# Patient Record
Sex: MALE | Race: WHITE | NOT HISPANIC OR LATINO | Employment: FULL TIME | URBAN - METROPOLITAN AREA
[De-identification: names, ages, dates, MRNs, and addresses within clinical notes are randomized per-mention and may not be internally consistent; named-entity substitution may affect disease eponyms.]

---

## 2024-11-03 ENCOUNTER — APPOINTMENT (OUTPATIENT)
Dept: RADIOLOGY | Facility: HOSPITAL | Age: 29
End: 2024-11-03

## 2024-11-03 ENCOUNTER — HOSPITAL ENCOUNTER (OUTPATIENT)
Facility: HOSPITAL | Age: 29
Setting detail: OBSERVATION
Discharge: HOME | End: 2024-11-04
Attending: EMERGENCY MEDICINE | Admitting: INTERNAL MEDICINE

## 2024-11-03 ENCOUNTER — APPOINTMENT (OUTPATIENT)
Dept: CARDIOLOGY | Facility: HOSPITAL | Age: 29
End: 2024-11-03

## 2024-11-03 DIAGNOSIS — R00.1 SEVERE SINUS BRADYCARDIA: ICD-10-CM

## 2024-11-03 DIAGNOSIS — R55 SYNCOPE AND COLLAPSE: Primary | ICD-10-CM

## 2024-11-03 LAB
ANION GAP SERPL CALC-SCNC: 10 MMOL/L (ref 10–20)
BASOPHILS # BLD AUTO: 0.04 X10*3/UL (ref 0–0.1)
BASOPHILS NFR BLD AUTO: 0.4 %
BUN SERPL-MCNC: 11 MG/DL (ref 6–23)
CALCIUM SERPL-MCNC: 8.8 MG/DL (ref 8.6–10.3)
CHLORIDE SERPL-SCNC: 105 MMOL/L (ref 98–107)
CO2 SERPL-SCNC: 24 MMOL/L (ref 21–32)
CREAT SERPL-MCNC: 1.2 MG/DL (ref 0.5–1.3)
EGFRCR SERPLBLD CKD-EPI 2021: 84 ML/MIN/1.73M*2
EOSINOPHIL # BLD AUTO: 0.07 X10*3/UL (ref 0–0.7)
EOSINOPHIL NFR BLD AUTO: 0.6 %
ERYTHROCYTE [DISTWIDTH] IN BLOOD BY AUTOMATED COUNT: 12 % (ref 11.5–14.5)
ETHANOL SERPL-MCNC: <10 MG/DL
GLUCOSE SERPL-MCNC: 115 MG/DL (ref 74–99)
HCT VFR BLD AUTO: 41.9 % (ref 41–52)
HGB BLD-MCNC: 14.5 G/DL (ref 13.5–17.5)
IMM GRANULOCYTES # BLD AUTO: 0.06 X10*3/UL (ref 0–0.7)
IMM GRANULOCYTES NFR BLD AUTO: 0.6 % (ref 0–0.9)
LACTATE SERPL-SCNC: 1 MMOL/L (ref 0.4–2)
LYMPHOCYTES # BLD AUTO: 4.52 X10*3/UL (ref 1.2–4.8)
LYMPHOCYTES NFR BLD AUTO: 41.6 %
MAGNESIUM SERPL-MCNC: 2.39 MG/DL (ref 1.6–2.4)
MCH RBC QN AUTO: 31.1 PG (ref 26–34)
MCHC RBC AUTO-ENTMCNC: 34.6 G/DL (ref 32–36)
MCV RBC AUTO: 90 FL (ref 80–100)
MONOCYTES # BLD AUTO: 0.94 X10*3/UL (ref 0.1–1)
MONOCYTES NFR BLD AUTO: 8.7 %
NEUTROPHILS # BLD AUTO: 5.23 X10*3/UL (ref 1.2–7.7)
NEUTROPHILS NFR BLD AUTO: 48.1 %
NRBC BLD-RTO: 0 /100 WBCS (ref 0–0)
PHOSPHATE SERPL-MCNC: 4.4 MG/DL (ref 2.5–4.9)
PLATELET # BLD AUTO: 236 X10*3/UL (ref 150–450)
POTASSIUM SERPL-SCNC: 3.4 MMOL/L (ref 3.5–5.3)
RBC # BLD AUTO: 4.66 X10*6/UL (ref 4.5–5.9)
SODIUM SERPL-SCNC: 136 MMOL/L (ref 136–145)
WBC # BLD AUTO: 10.9 X10*3/UL (ref 4.4–11.3)

## 2024-11-03 PROCEDURE — 82077 ASSAY SPEC XCP UR&BREATH IA: CPT | Performed by: EMERGENCY MEDICINE

## 2024-11-03 PROCEDURE — 80048 BASIC METABOLIC PNL TOTAL CA: CPT | Performed by: EMERGENCY MEDICINE

## 2024-11-03 PROCEDURE — 72125 CT NECK SPINE W/O DYE: CPT | Performed by: RADIOLOGY

## 2024-11-03 PROCEDURE — 36415 COLL VENOUS BLD VENIPUNCTURE: CPT | Performed by: EMERGENCY MEDICINE

## 2024-11-03 PROCEDURE — 70450 CT HEAD/BRAIN W/O DYE: CPT

## 2024-11-03 PROCEDURE — 71045 X-RAY EXAM CHEST 1 VIEW: CPT

## 2024-11-03 PROCEDURE — 72125 CT NECK SPINE W/O DYE: CPT

## 2024-11-03 PROCEDURE — 83735 ASSAY OF MAGNESIUM: CPT | Performed by: EMERGENCY MEDICINE

## 2024-11-03 PROCEDURE — 99223 1ST HOSP IP/OBS HIGH 75: CPT | Performed by: INTERNAL MEDICINE

## 2024-11-03 PROCEDURE — 83605 ASSAY OF LACTIC ACID: CPT | Performed by: EMERGENCY MEDICINE

## 2024-11-03 PROCEDURE — 70450 CT HEAD/BRAIN W/O DYE: CPT | Performed by: RADIOLOGY

## 2024-11-03 PROCEDURE — 93005 ELECTROCARDIOGRAM TRACING: CPT

## 2024-11-03 PROCEDURE — 84100 ASSAY OF PHOSPHORUS: CPT | Performed by: EMERGENCY MEDICINE

## 2024-11-03 PROCEDURE — 85025 COMPLETE CBC W/AUTO DIFF WBC: CPT | Performed by: EMERGENCY MEDICINE

## 2024-11-03 PROCEDURE — 99285 EMERGENCY DEPT VISIT HI MDM: CPT

## 2024-11-03 RX ORDER — POLYETHYLENE GLYCOL 3350 17 G/17G
17 POWDER, FOR SOLUTION ORAL DAILY PRN
Status: DISCONTINUED | OUTPATIENT
Start: 2024-11-03 | End: 2024-11-04 | Stop reason: HOSPADM

## 2024-11-03 RX ORDER — ONDANSETRON 4 MG/1
4 TABLET, FILM COATED ORAL EVERY 8 HOURS PRN
Status: DISCONTINUED | OUTPATIENT
Start: 2024-11-03 | End: 2024-11-04 | Stop reason: HOSPADM

## 2024-11-03 RX ORDER — TALC
3 POWDER (GRAM) TOPICAL NIGHTLY PRN
Status: DISCONTINUED | OUTPATIENT
Start: 2024-11-03 | End: 2024-11-04 | Stop reason: HOSPADM

## 2024-11-03 RX ORDER — ONDANSETRON HYDROCHLORIDE 2 MG/ML
4 INJECTION, SOLUTION INTRAVENOUS EVERY 8 HOURS PRN
Status: DISCONTINUED | OUTPATIENT
Start: 2024-11-03 | End: 2024-11-04 | Stop reason: HOSPADM

## 2024-11-03 RX ORDER — ACETAMINOPHEN 160 MG/5ML
650 SOLUTION ORAL EVERY 4 HOURS PRN
Status: DISCONTINUED | OUTPATIENT
Start: 2024-11-03 | End: 2024-11-04 | Stop reason: HOSPADM

## 2024-11-03 RX ORDER — ACETAMINOPHEN 325 MG/1
650 TABLET ORAL EVERY 4 HOURS PRN
Status: DISCONTINUED | OUTPATIENT
Start: 2024-11-03 | End: 2024-11-04 | Stop reason: HOSPADM

## 2024-11-03 RX ORDER — ACETAMINOPHEN 650 MG/1
650 SUPPOSITORY RECTAL EVERY 4 HOURS PRN
Status: DISCONTINUED | OUTPATIENT
Start: 2024-11-03 | End: 2024-11-04 | Stop reason: HOSPADM

## 2024-11-03 ASSESSMENT — PAIN SCALES - GENERAL: PAINLEVEL_OUTOF10: 5 - MODERATE PAIN

## 2024-11-03 ASSESSMENT — PAIN DESCRIPTION - LOCATION: LOCATION: MOUTH

## 2024-11-03 ASSESSMENT — COLUMBIA-SUICIDE SEVERITY RATING SCALE - C-SSRS
1. IN THE PAST MONTH, HAVE YOU WISHED YOU WERE DEAD OR WISHED YOU COULD GO TO SLEEP AND NOT WAKE UP?: NO
6. HAVE YOU EVER DONE ANYTHING, STARTED TO DO ANYTHING, OR PREPARED TO DO ANYTHING TO END YOUR LIFE?: NO
2. HAVE YOU ACTUALLY HAD ANY THOUGHTS OF KILLING YOURSELF?: NO

## 2024-11-03 ASSESSMENT — PAIN - FUNCTIONAL ASSESSMENT: PAIN_FUNCTIONAL_ASSESSMENT: 0-10

## 2024-11-03 ASSESSMENT — LIFESTYLE VARIABLES
EVER FELT BAD OR GUILTY ABOUT YOUR DRINKING: NO
HAVE PEOPLE ANNOYED YOU BY CRITICIZING YOUR DRINKING: NO
TOTAL SCORE: 0
HAVE YOU EVER FELT YOU SHOULD CUT DOWN ON YOUR DRINKING: NO
EVER HAD A DRINK FIRST THING IN THE MORNING TO STEADY YOUR NERVES TO GET RID OF A HANGOVER: NO

## 2024-11-04 ENCOUNTER — APPOINTMENT (OUTPATIENT)
Dept: CARDIOLOGY | Facility: HOSPITAL | Age: 29
End: 2024-11-04

## 2024-11-04 VITALS
WEIGHT: 215 LBS | RESPIRATION RATE: 16 BRPM | OXYGEN SATURATION: 98 % | HEIGHT: 69 IN | TEMPERATURE: 97.5 F | HEART RATE: 51 BPM | DIASTOLIC BLOOD PRESSURE: 68 MMHG | BODY MASS INDEX: 31.84 KG/M2 | SYSTOLIC BLOOD PRESSURE: 133 MMHG

## 2024-11-04 PROBLEM — F12.90 MARIJUANA USE: Status: ACTIVE | Noted: 2024-11-04

## 2024-11-04 PROBLEM — E87.6 HYPOKALEMIA: Status: ACTIVE | Noted: 2024-11-04

## 2024-11-04 PROBLEM — R00.1 BRADYCARDIA: Status: ACTIVE | Noted: 2024-11-04

## 2024-11-04 LAB
AMPHETAMINES UR QL SCN: ABNORMAL
ANION GAP SERPL CALC-SCNC: 9 MMOL/L (ref 10–20)
AORTIC VALVE MEAN GRADIENT: 3 MMHG
AORTIC VALVE PEAK VELOCITY: 1.18 M/S
ATRIAL RATE: 60 BPM
AV PEAK GRADIENT: 6 MMHG
AVA (PEAK VEL): 3.43 CM2
AVA (VTI): 3.37 CM2
BARBITURATES UR QL SCN: ABNORMAL
BENZODIAZ UR QL SCN: ABNORMAL
BUN SERPL-MCNC: 11 MG/DL (ref 6–23)
BZE UR QL SCN: ABNORMAL
CALCIUM SERPL-MCNC: 8.6 MG/DL (ref 8.6–10.3)
CANNABINOIDS UR QL SCN: ABNORMAL
CARDIAC TROPONIN I PNL SERPL HS: 3 NG/L (ref 0–20)
CHLORIDE SERPL-SCNC: 106 MMOL/L (ref 98–107)
CO2 SERPL-SCNC: 24 MMOL/L (ref 21–32)
CREAT SERPL-MCNC: 1.03 MG/DL (ref 0.5–1.3)
EGFRCR SERPLBLD CKD-EPI 2021: >90 ML/MIN/1.73M*2
EJECTION FRACTION APICAL 4 CHAMBER: 60.5
EJECTION FRACTION: 60 %
ERYTHROCYTE [DISTWIDTH] IN BLOOD BY AUTOMATED COUNT: 12.2 % (ref 11.5–14.5)
FENTANYL+NORFENTANYL UR QL SCN: ABNORMAL
GLUCOSE SERPL-MCNC: 116 MG/DL (ref 74–99)
HCT VFR BLD AUTO: 42.9 % (ref 41–52)
HGB BLD-MCNC: 15 G/DL (ref 13.5–17.5)
HOLD SPECIMEN: NORMAL
LEFT ATRIUM VOLUME AREA LENGTH INDEX BSA: 26.2 ML/M2
LEFT VENTRICLE INTERNAL DIMENSION DIASTOLE: 4.8 CM (ref 3.5–6)
LEFT VENTRICULAR OUTFLOW TRACT DIAMETER: 2.1 CM
LV EJECTION FRACTION BIPLANE: 66 %
MCH RBC QN AUTO: 31.9 PG (ref 26–34)
MCHC RBC AUTO-ENTMCNC: 35 G/DL (ref 32–36)
MCV RBC AUTO: 91 FL (ref 80–100)
METHADONE UR QL SCN: ABNORMAL
MITRAL VALVE E/A RATIO: 2.34
NRBC BLD-RTO: 0 /100 WBCS (ref 0–0)
OPIATES UR QL SCN: ABNORMAL
OXYCODONE+OXYMORPHONE UR QL SCN: ABNORMAL
P AXIS: 57 DEGREES
P OFFSET: 195 MS
P ONSET: 138 MS
PCP UR QL SCN: ABNORMAL
PLATELET # BLD AUTO: 261 X10*3/UL (ref 150–450)
POTASSIUM SERPL-SCNC: 3.9 MMOL/L (ref 3.5–5.3)
PR INTERVAL: 164 MS
Q ONSET: 220 MS
QRS COUNT: 10 BEATS
QRS DURATION: 94 MS
QT INTERVAL: 392 MS
QTC CALCULATION(BAZETT): 392 MS
QTC FREDERICIA: 392 MS
R AXIS: 66 DEGREES
RBC # BLD AUTO: 4.7 X10*6/UL (ref 4.5–5.9)
RIGHT VENTRICLE FREE WALL PEAK S': 16.1 CM/S
RIGHT VENTRICLE PEAK SYSTOLIC PRESSURE: 20.3 MMHG
SODIUM SERPL-SCNC: 135 MMOL/L (ref 136–145)
T AXIS: 69 DEGREES
T OFFSET: 416 MS
TRICUSPID ANNULAR PLANE SYSTOLIC EXCURSION: 2.5 CM
VENTRICULAR RATE: 60 BPM
WBC # BLD AUTO: 11.2 X10*3/UL (ref 4.4–11.3)

## 2024-11-04 PROCEDURE — 93306 TTE W/DOPPLER COMPLETE: CPT

## 2024-11-04 PROCEDURE — 2500000004 HC RX 250 GENERAL PHARMACY W/ HCPCS (ALT 636 FOR OP/ED): Performed by: INTERNAL MEDICINE

## 2024-11-04 PROCEDURE — 99239 HOSP IP/OBS DSCHRG MGMT >30: CPT

## 2024-11-04 PROCEDURE — 93306 TTE W/DOPPLER COMPLETE: CPT | Performed by: INTERNAL MEDICINE

## 2024-11-04 PROCEDURE — 2500000001 HC RX 250 WO HCPCS SELF ADMINISTERED DRUGS (ALT 637 FOR MEDICARE OP): Performed by: INTERNAL MEDICINE

## 2024-11-04 PROCEDURE — 93005 ELECTROCARDIOGRAM TRACING: CPT

## 2024-11-04 PROCEDURE — G0378 HOSPITAL OBSERVATION PER HR: HCPCS

## 2024-11-04 PROCEDURE — 36415 COLL VENOUS BLD VENIPUNCTURE: CPT | Performed by: INTERNAL MEDICINE

## 2024-11-04 PROCEDURE — 84484 ASSAY OF TROPONIN QUANT: CPT | Performed by: NURSE PRACTITIONER

## 2024-11-04 PROCEDURE — 99223 1ST HOSP IP/OBS HIGH 75: CPT | Performed by: INTERNAL MEDICINE

## 2024-11-04 PROCEDURE — 85027 COMPLETE CBC AUTOMATED: CPT | Performed by: INTERNAL MEDICINE

## 2024-11-04 PROCEDURE — 80307 DRUG TEST PRSMV CHEM ANLYZR: CPT | Performed by: EMERGENCY MEDICINE

## 2024-11-04 PROCEDURE — 80048 BASIC METABOLIC PNL TOTAL CA: CPT | Performed by: INTERNAL MEDICINE

## 2024-11-04 RX ORDER — POTASSIUM CHLORIDE 1.5 G/1.58G
40 POWDER, FOR SOLUTION ORAL ONCE
Status: COMPLETED | OUTPATIENT
Start: 2024-11-04 | End: 2024-11-04

## 2024-11-04 SDOH — SOCIAL STABILITY: SOCIAL INSECURITY
WITHIN THE LAST YEAR, HAVE YOU BEEN KICKED, HIT, SLAPPED, OR OTHERWISE PHYSICALLY HURT BY YOUR PARTNER OR EX-PARTNER?: NO

## 2024-11-04 SDOH — ECONOMIC STABILITY: HOUSING INSECURITY: IN THE PAST 12 MONTHS, HOW MANY TIMES HAVE YOU MOVED WHERE YOU WERE LIVING?: 0

## 2024-11-04 SDOH — ECONOMIC STABILITY: FOOD INSECURITY
WITHIN THE PAST 12 MONTHS, YOU WORRIED THAT YOUR FOOD WOULD RUN OUT BEFORE YOU GOT THE MONEY TO BUY MORE.: PATIENT DECLINED

## 2024-11-04 SDOH — SOCIAL STABILITY: SOCIAL INSECURITY: WITHIN THE LAST YEAR, HAVE YOU BEEN AFRAID OF YOUR PARTNER OR EX-PARTNER?: NO

## 2024-11-04 SDOH — HEALTH STABILITY: PHYSICAL HEALTH: ON AVERAGE, HOW MANY MINUTES DO YOU ENGAGE IN EXERCISE AT THIS LEVEL?: 0 MIN

## 2024-11-04 SDOH — ECONOMIC STABILITY: INCOME INSECURITY
IN THE PAST 12 MONTHS HAS THE ELECTRIC, GAS, OIL, OR WATER COMPANY THREATENED TO SHUT OFF SERVICES IN YOUR HOME?: PATIENT DECLINED

## 2024-11-04 SDOH — SOCIAL STABILITY: SOCIAL INSECURITY: DOES ANYONE TRY TO KEEP YOU FROM HAVING/CONTACTING OTHER FRIENDS OR DOING THINGS OUTSIDE YOUR HOME?: NO

## 2024-11-04 SDOH — ECONOMIC STABILITY: HOUSING INSECURITY: AT ANY TIME IN THE PAST 12 MONTHS, WERE YOU HOMELESS OR LIVING IN A SHELTER (INCLUDING NOW)?: PATIENT DECLINED

## 2024-11-04 SDOH — ECONOMIC STABILITY: FOOD INSECURITY: WITHIN THE PAST 12 MONTHS, THE FOOD YOU BOUGHT JUST DIDN'T LAST AND YOU DIDN'T HAVE MONEY TO GET MORE.: PATIENT DECLINED

## 2024-11-04 SDOH — SOCIAL STABILITY: SOCIAL INSECURITY: HAVE YOU HAD THOUGHTS OF HARMING ANYONE ELSE?: NO

## 2024-11-04 SDOH — SOCIAL STABILITY: SOCIAL INSECURITY: DO YOU FEEL UNSAFE GOING BACK TO THE PLACE WHERE YOU ARE LIVING?: NO

## 2024-11-04 SDOH — ECONOMIC STABILITY: FOOD INSECURITY: HOW HARD IS IT FOR YOU TO PAY FOR THE VERY BASICS LIKE FOOD, HOUSING, MEDICAL CARE, AND HEATING?: PATIENT DECLINED

## 2024-11-04 SDOH — HEALTH STABILITY: PHYSICAL HEALTH: ON AVERAGE, HOW MANY DAYS PER WEEK DO YOU ENGAGE IN MODERATE TO STRENUOUS EXERCISE (LIKE A BRISK WALK)?: 0 DAYS

## 2024-11-04 SDOH — SOCIAL STABILITY: SOCIAL INSECURITY: ARE THERE ANY APPARENT SIGNS OF INJURIES/BEHAVIORS THAT COULD BE RELATED TO ABUSE/NEGLECT?: NO

## 2024-11-04 SDOH — SOCIAL STABILITY: SOCIAL INSECURITY: ARE YOU OR HAVE YOU BEEN THREATENED OR ABUSED PHYSICALLY, EMOTIONALLY, OR SEXUALLY BY ANYONE?: NO

## 2024-11-04 SDOH — SOCIAL STABILITY: SOCIAL INSECURITY: ABUSE: ADULT

## 2024-11-04 SDOH — SOCIAL STABILITY: SOCIAL INSECURITY: HAS ANYONE EVER THREATENED TO HURT YOUR FAMILY OR YOUR PETS?: NO

## 2024-11-04 SDOH — SOCIAL STABILITY: SOCIAL INSECURITY: DO YOU FEEL ANYONE HAS EXPLOITED OR TAKEN ADVANTAGE OF YOU FINANCIALLY OR OF YOUR PERSONAL PROPERTY?: NO

## 2024-11-04 SDOH — SOCIAL STABILITY: SOCIAL INSECURITY
WITHIN THE LAST YEAR, HAVE YOU BEEN RAPED OR FORCED TO HAVE ANY KIND OF SEXUAL ACTIVITY BY YOUR PARTNER OR EX-PARTNER?: NO

## 2024-11-04 SDOH — ECONOMIC STABILITY: TRANSPORTATION INSECURITY
IN THE PAST 12 MONTHS, HAS LACK OF TRANSPORTATION KEPT YOU FROM MEDICAL APPOINTMENTS OR FROM GETTING MEDICATIONS?: PATIENT DECLINED

## 2024-11-04 SDOH — SOCIAL STABILITY: SOCIAL INSECURITY: HAVE YOU HAD ANY THOUGHTS OF HARMING ANYONE ELSE?: NO

## 2024-11-04 SDOH — SOCIAL STABILITY: SOCIAL INSECURITY: WERE YOU ABLE TO COMPLETE ALL THE BEHAVIORAL HEALTH SCREENINGS?: YES

## 2024-11-04 SDOH — ECONOMIC STABILITY: HOUSING INSECURITY: IN THE LAST 12 MONTHS, WAS THERE A TIME WHEN YOU WERE NOT ABLE TO PAY THE MORTGAGE OR RENT ON TIME?: PATIENT DECLINED

## 2024-11-04 SDOH — SOCIAL STABILITY: SOCIAL INSECURITY: WITHIN THE LAST YEAR, HAVE YOU BEEN HUMILIATED OR EMOTIONALLY ABUSED IN OTHER WAYS BY YOUR PARTNER OR EX-PARTNER?: NO

## 2024-11-04 ASSESSMENT — ACTIVITIES OF DAILY LIVING (ADL)
ADEQUATE_TO_COMPLETE_ADL: YES
BATHING: INDEPENDENT
TOILETING: INDEPENDENT
LACK_OF_TRANSPORTATION: PATIENT DECLINED
HEARING - RIGHT EAR: FUNCTIONAL
PATIENT'S MEMORY ADEQUATE TO SAFELY COMPLETE DAILY ACTIVITIES?: YES
JUDGMENT_ADEQUATE_SAFELY_COMPLETE_DAILY_ACTIVITIES: YES
HEARING - LEFT EAR: FUNCTIONAL
GROOMING: INDEPENDENT
WALKS IN HOME: INDEPENDENT
LACK_OF_TRANSPORTATION: PATIENT DECLINED
FEEDING YOURSELF: INDEPENDENT
DRESSING YOURSELF: INDEPENDENT

## 2024-11-04 ASSESSMENT — COGNITIVE AND FUNCTIONAL STATUS - GENERAL
MOBILITY SCORE: 24
DAILY ACTIVITIY SCORE: 24
MOBILITY SCORE: 24
DAILY ACTIVITIY SCORE: 24
PATIENT BASELINE BEDBOUND: NO

## 2024-11-04 ASSESSMENT — LIFESTYLE VARIABLES
HOW MANY STANDARD DRINKS CONTAINING ALCOHOL DO YOU HAVE ON A TYPICAL DAY: 3 OR 4
AUDIT-C TOTAL SCORE: -1
HOW OFTEN DO YOU HAVE 6 OR MORE DRINKS ON ONE OCCASION: PATIENT DECLINED
AUDIT-C TOTAL SCORE: -1
HOW OFTEN DO YOU HAVE A DRINK CONTAINING ALCOHOL: 2-3 TIMES A WEEK
SKIP TO QUESTIONS 9-10: 0

## 2024-11-04 ASSESSMENT — PAIN SCALES - GENERAL: PAINLEVEL_OUTOF10: 0 - NO PAIN

## 2024-11-04 ASSESSMENT — PATIENT HEALTH QUESTIONNAIRE - PHQ9
SUM OF ALL RESPONSES TO PHQ9 QUESTIONS 1 & 2: 0
1. LITTLE INTEREST OR PLEASURE IN DOING THINGS: NOT AT ALL
2. FEELING DOWN, DEPRESSED OR HOPELESS: NOT AT ALL

## 2024-11-04 ASSESSMENT — PAIN - FUNCTIONAL ASSESSMENT: PAIN_FUNCTIONAL_ASSESSMENT: 0-10

## 2024-11-04 NOTE — CONSULTS
Cardiology Consult Note      Date:   11/4/2024  Patient name:  Silviano Moss  Date of admission:  11/3/2024 10:37 PM  MRN:   [id number]  YOB: 1995  Time of Consult:  10:38 AM    Consulting Cardiologist: Dr. Paco Molina, APRN, CNP  Primary Cardiologist:   None     Referring Provider: Dr Serrato      Admission Diagnosis:     Syncope and collapse      History of Present Illness:      Silviano Moss is a 29 y.o.  male patient who is being at the request of Dr. Serrato for inpatient consultation of  syncopal . He was admitted on 11/3/2024.  Previous [facility] and [facility] records have been reviewed in detail.    Patient has no significant past medical history  Patient states that yesterday he went and did some family yard work for about an hour or 2 he states he had 2 beers he stop by at a friend's for playing darts he said nothing out of the ordinary he was smoking marijuana which is not any different than a normal day he states he got very warm feeling felt lightheaded started to walk up the steps had a complete syncopal event.  Per his friends then he went to get up again and he fell down again he actually cracked his tooth.  He states he did not really remember falling he states that he is denied ever having any chest pain, shortness of breath, nausea, vomiting, PND, orthopnea, claudications.  He denies anybody in the younger stages of having syncope or sudden cardiac death.  The staff this morning did go on to tell me when EMS arrived that his heart rate was in the 30s and 40s but then bounced right back up.  He states he feels really good right now denies any complaints at this present time    EKG is normal sinus rhythm no acute ST ovation's or depressions appreciated  Drug screen positive for cannabis  Alcohol level less than 10  Sodium 135, potassium 3.9    Cardiac history  None      Allergies:     No Known Allergies      Past Medical History:     History reviewed.  "No pertinent past medical history.    Past Surgical History:     History reviewed. No pertinent surgical history.    Family History:     No family history on file.    Social History:     Social History     Tobacco Use    Smoking status: Every Day     Current packs/day: 1.00     Average packs/day: 1 pack/day for 17.1 years (17.1 ttl pk-yrs)     Types: Cigarettes     Start date: 10/2007    Smokeless tobacco: Former   Vaping Use    Vaping status: Every Day   Substance Use Topics    Alcohol use: Yes    Drug use: Yes     Types: Marijuana       CURRENT INPATIENT MEDICATIONS          No current outpatient medications     Review of Systems:      12 point review of systems was obtained in detail and is negative other than that detailed above.    Vital Signs:     Vitals:    11/03/24 2345 11/04/24 0009 11/04/24 0034 11/04/24 0741   BP: 106/63 118/63  120/57   BP Location:    Left arm   Patient Position:  Sitting  Lying   Pulse: 70 70  56   Resp:  17  18   Temp:  35.9 °C (96.6 °F)  36.7 °C (98.1 °F)   TempSrc: Temporal   Temporal   SpO2: 100% 97%  96%   Weight:   97.5 kg (215 lb)    Height:   1.753 m (5' 9.02\")        Intake/Output Summary (Last 24 hours) at 11/4/2024 1038  Last data filed at 11/4/2024 0552  Gross per 24 hour   Intake 500 ml   Output 400 ml   Net 100 ml       Wt Readings from Last 4 Encounters:   11/04/24 97.5 kg (215 lb)       Physical Examination:     GENERAL APPEARANCE: Well developed, well nourished, in no acute distress.  CHEST: Symmetric and non-tender.  INTEGUMENT: Skin warm and dry, without gross excoriationis or lesions.  HEENT: No gross abnormalities of conjunctiva, teeth, gums, oral mucosa  NECK: Supple, no JVD, no bruit. Thyroid not palpable. Carotid upstrokes normal.  NEURO/PSHCY: Alert and oriented x3; appropriate behavior and responses and responses, grossly normal cerebellar function with normal balance and coordination  LUNGS: Clear to auscultation bilaterally; normal respiratory " effort.  HEART: Rate and rhythm regular with no evident murmur; no gallop appreciated. There are no rubs, clicks or heaves. PMI nondisplaced.  ABDOMEN: Soft, nontender, no palpable hepatosplenomegaly, no mases, no bruits. Abdominal aorta not noted to be enlarged.  MUSCULOSKELETAL: Ambulatory with normal tandem gait.  EXTREMITIES: Warm with good color, no clubbing or cyanois. There is no edema noted.  PERIPHERAL VASCULAR: Pulses present and equally palpable; 2+ throughout. No femoral bruits.      Lab:     CBC:   Results from last 7 days   Lab Units 11/04/24  0525 11/03/24  2244   WBC AUTO x10*3/uL 11.2 10.9   RBC AUTO x10*6/uL 4.70 4.66   HEMOGLOBIN g/dL 15.0 14.5   HEMATOCRIT % 42.9 41.9   MCV fL 91 90   MCH pg 31.9 31.1   MCHC g/dL 35.0 34.6   RDW % 12.2 12.0   PLATELETS AUTO x10*3/uL 261 236     CMP:    Results from last 7 days   Lab Units 11/04/24  0525 11/03/24  2244   SODIUM mmol/L 135* 136   POTASSIUM mmol/L 3.9 3.4*   CHLORIDE mmol/L 106 105   CO2 mmol/L 24 24   BUN mg/dL 11 11   CREATININE mg/dL 1.03 1.20   GLUCOSE mg/dL 116* 115*   CALCIUM mg/dL 8.6 8.8     BMP:    Results from last 7 days   Lab Units 11/04/24  0525 11/03/24  2244   SODIUM mmol/L 135* 136   POTASSIUM mmol/L 3.9 3.4*   CHLORIDE mmol/L 106 105   CO2 mmol/L 24 24   BUN mg/dL 11 11   CREATININE mg/dL 1.03 1.20   CALCIUM mg/dL 8.6 8.8   GLUCOSE mg/dL 116* 115*     Magnesium:  Results from last 7 days   Lab Units 11/03/24  2244   MAGNESIUM mg/dL 2.39     Troponin:    Results from last 7 days   Lab Units 11/04/24  0525   TROPHS ng/L 3     BNP:     Lipid Panel:         Diagnostic Studies:   @No results found for this or any previous visit.    ECG 12 lead    Result Date: 11/4/2024  Normal sinus rhythm Nonspecific T wave abnormality Abnormal ECG No previous ECGs available See ED provider note for full interpretation and clinical correlation    CT head wo IV contrast    Result Date: 11/3/2024  Interpreted By:  Ginna Rivas, STUDY: CT HEAD WO IV  CONTRAST; CT CERVICAL SPINE WO IV CONTRAST;  11/3/2024 11:04 pm   INDICATION: Signs/Symptoms:Syncope head injury; Signs/Symptoms:Head injury syncope.   COMPARISON: None.   ACCESSION NUMBER(S): [id number]; [id number]   ORDERING CLINICIAN: YON VAZQUEZ   TECHNIQUE: Noncontrast CT images of head. Axial noncontrast CT images of the cervical spine with coronal and sagittal reconstructed images.   FINDINGS: BRAIN PARENCHYMA: Gray-white matter interfaces are preserved. No mass effect or midline shift.   HEMORRHAGE: No acute intracranial hemorrhage. VENTRICLES and EXTRA-AXIAL SPACES: Normal size. EXTRACRANIAL SOFT TISSUES: Within normal limits. PARANASAL SINUSES/MASTOIDS: Mucosal thickening with near complete opacification of the right mastoid air cells and scattered opacification of the ethmoid air cells. The visualized mastoid air cells are aerated. CALVARIUM: No depressed skull fracture. No destructive osseous lesion.   OTHER FINDINGS: None.   CERVICAL SPINE:   ALIGNMENT: Normal. VERTEBRAE: No acute fracture. SPINAL CANAL: No critical spinal canal stenosis. PREVERTEBRAL SOFT TISSUES: No prevertebral soft tissue swelling. LUNG APICES: Imaged portion of the lung apices are within normal limits.   OTHER FINDINGS: Cervical mild lymphadenopathy which may be reactive..       No acute intracranial hemorrhage or mass effect.   No acute fracture or traumatic subluxation of the cervical spine.     MACRO: None.   Signed by: Ginna Rivas 11/3/2024 11:53 PM Dictation workstation:   [id number]    CT cervical spine wo IV contrast    Result Date: 11/3/2024  Interpreted By:  Ginna Rivas, STUDY: CT HEAD WO IV CONTRAST; CT CERVICAL SPINE WO IV CONTRAST;  11/3/2024 11:04 pm   INDICATION: Signs/Symptoms:Syncope head injury; Signs/Symptoms:Head injury syncope.   COMPARISON: None.   ACCESSION NUMBER(S): [id number]; [id number]   ORDERING CLINICIAN: YON VAZQUEZ   TECHNIQUE: Noncontrast CT images of head. Axial noncontrast  CT images of the cervical spine with coronal and sagittal reconstructed images.   FINDINGS: BRAIN PARENCHYMA: Gray-white matter interfaces are preserved. No mass effect or midline shift.   HEMORRHAGE: No acute intracranial hemorrhage. VENTRICLES and EXTRA-AXIAL SPACES: Normal size. EXTRACRANIAL SOFT TISSUES: Within normal limits. PARANASAL SINUSES/MASTOIDS: Mucosal thickening with near complete opacification of the right mastoid air cells and scattered opacification of the ethmoid air cells. The visualized mastoid air cells are aerated. CALVARIUM: No depressed skull fracture. No destructive osseous lesion.   OTHER FINDINGS: None.   CERVICAL SPINE:   ALIGNMENT: Normal. VERTEBRAE: No acute fracture. SPINAL CANAL: No critical spinal canal stenosis. PREVERTEBRAL SOFT TISSUES: No prevertebral soft tissue swelling. LUNG APICES: Imaged portion of the lung apices are within normal limits.   OTHER FINDINGS: Cervical mild lymphadenopathy which may be reactive..       No acute intracranial hemorrhage or mass effect.   No acute fracture or traumatic subluxation of the cervical spine.     MACRO: None.   Signed by: Ginna Rivas 11/3/2024 11:53 PM Dictation workstation:   [id number]    XR chest 1 view    Result Date: 11/3/2024  STUDY: Chest Radiograph;  11/3/2024 11:12 PM INDICATION: Aspiration. COMPARISON: None Available ACCESSION NUMBER(S): [id number] ORDERING CLINICIAN: YON VAZQUEZ TECHNIQUE:  Frontal chest was obtained at 23:12 hours. FINDINGS: CARDIOMEDIASTINAL SILHOUETTE: Cardiomediastinal silhouette is normal in size and configuration.  LUNGS: Lungs are clear.  ABDOMEN: No remarkable upper abdominal findings.  BONES: No acute osseous changes.    No acute cardiopulmonary abnormality. Signed by Jaylen Shabazz MD      No echocardiogram results found for the past 14 days    Radiology:     XR chest 1 view   Final Result   No acute cardiopulmonary abnormality.   Signed by Jaylen Shabazz MD      CT head wo IV contrast    Final Result   No acute intracranial hemorrhage or mass effect.        No acute fracture or traumatic subluxation of the cervical spine.             MACRO:   None.        Signed by: Ginna Rivas 11/3/2024 11:53 PM   Dictation workstation:   [id number]      CT cervical spine wo IV contrast   Final Result   No acute intracranial hemorrhage or mass effect.        No acute fracture or traumatic subluxation of the cervical spine.             MACRO:   None.        Signed by: Ginna Rivas 11/3/2024 11:53 PM   Dictation workstation:   [id number]      Transthoracic Echo (TTE) Complete    (Results Pending)       Problem List:     Patient Active Problem List   Diagnosis    Syncope and collapse    Bradycardia    Marijuana use    Hypokalemia       Assessment:   29-year-old gentleman seen evaluated bedside in telemetry and in conjunction with Venancio Sandoval RN, CNP.    Bedside examination evaluation were performed by me.    Chart reviewed in detail discussed the patient and his family.    Impression:  Syncopal event  Bradycardia resolved  Substance use in the form of marijuana  Alcohol use      Plan:   Tele monitoring  Check Trop  Daily EKG's  Complete 2d echo  Orthostatic vital signs  Ambulate in hallway  on monitor  Magnesium greater than 2.0  Potassium tween 4.0-4.5      Discussion:  This 29-year-old gentleman with no medical conditions or history.  He apparently passed out yesterday after having a few beers watching a football game along with smoking some marijuana.  He suddenly felt a little bit flushed so he stood up and went to go out of the basement up the steps but that he felt.  He passed out.  He did hit his tooth when he did fall.  No other major injuries were sustained.  He was ultimately brought to the hospital.  Thus far no significant findings have been observed.  He is currently awaiting echocardiogram.  It is highly likely that the patient just had a vasovagal type episode probably vasodilated from the  events as discussed.  We will likely discharge later today if echo is normal.  Will plan an outpatient monitor for a week and also an office follow-up with stress testing although again it is unlikely any cardiovascular issues are present.  I discussed in detail with the patient going over his complication alternatives and he is agreeable.  Will [place] back with echo results and any additional recommendations later today.      Paco Grady DO,[facility]      Venancio Molina [facility]      Of note, this documentation is completed using the Dragon Dictation system (voice recognition software). There may be spelling and/or grammatical errors that were not corrected prior to final submission.      Electronically signed by Paco Grady DO, on 11/4/2024 at 10:38 AM

## 2024-11-04 NOTE — H&P
History Of Present Illness  Silviano Moss is a 29 y.o. male presenting with syncope episode.  He reported was at home with friends playing dart, and drinking alcohol smoking marijuana when he suddenly fell forward on his face striking his face on the floor.  Evidently he cracked his tooth and injuring his lip and has some bleeding over his face.  He reported the room was hot and he felt needed to get out to get some fresh air.  He denies similar episode in the past.  Prior to this event he was at his normal health.  He did have a few drinks tonight.  EMS found him bradycardic in the 30s and 40s.  He was given IV fluid as well for hypotension.  He denies family history of syncope or sudden death.  He is the only child for his family.  His mother passed away from brain tumor.    ER course: Patient was awake, alert, oriented.  By the time he arrived to the ER his vitals were stable.  His heart rate was in the 60s in the ER.  He denies chest pain.  His EKG shows sinus rhythm no ischemic changes.  His labs showed potassium 3.4, has normal sodium and normal magnesium.  Lactate was normal and there was no leukocytosis.  Chest x-ray reported unremarkable.  CT head also reported negative for acute intracranial pathology.  CT cervical spine was negative as well.    Past Medical History  Denies significant past medical history.    Surgical History  He has no past surgical history on file.     Social History  Smokes marijuana, tobacco, and abnormal alcohol use.    Family History  Mother passed away from brain tumor.     Allergies  Patient has no known allergies.    Review of Systems  10/10 points review of system were conducted, negative except as above.    Physical Exam  Constitutional:       Appearance: Normal appearance. He is not ill-appearing or diaphoretic.   HENT:      Head: Normocephalic.      Comments: There was some dried blood on his beard.  There is no active bleeding.     Nose: Nose normal.      Mouth/Throat:  "     Pharynx: No oropharyngeal exudate or posterior oropharyngeal erythema.   Eyes:      General: No scleral icterus.     Conjunctiva/sclera: Conjunctivae normal.      Pupils: Pupils are equal, round, and reactive to light.   Cardiovascular:      Rate and Rhythm: Normal rate and regular rhythm.      Pulses: Normal pulses.      Heart sounds: No murmur heard.  Pulmonary:      Effort: Pulmonary effort is normal. No respiratory distress.      Breath sounds: Normal breath sounds. No stridor. No wheezing, rhonchi or rales.   Chest:      Chest wall: No tenderness.   Abdominal:      General: There is no distension.      Palpations: There is no mass.      Tenderness: There is no abdominal tenderness. There is no right CVA tenderness, left CVA tenderness, guarding or rebound.      Hernia: No hernia is present.   Musculoskeletal:         General: Normal range of motion.      Cervical back: Normal range of motion and neck supple. No rigidity or tenderness.      Right lower leg: No edema.      Left lower leg: No edema.   Skin:     General: Skin is warm and dry.      Findings: No lesion or rash.   Neurological:      General: No focal deficit present.      Mental Status: He is alert and oriented to person, place, and time. Mental status is at baseline.   Psychiatric:         Mood and Affect: Mood normal.         Behavior: Behavior normal.          Last Recorded Vitals  Blood pressure 118/63, pulse 70, temperature 35.9 °C (96.6 °F), resp. rate 17, height 1.753 m (5' 9.02\"), weight 97.5 kg (215 lb), SpO2 97%.    Relevant Results  Scheduled medications  potassium chloride, 40 mEq, oral, Once      Continuous medications     PRN medications  PRN medications: acetaminophen **OR** acetaminophen **OR** acetaminophen, melatonin, ondansetron **OR** ondansetron, polyethylene glycol    Results for orders placed or performed during the hospital encounter of 11/03/24 (from the past 24 hours)   CBC and Auto Differential   Result Value Ref Range "    WBC 10.9 4.4 - 11.3 x10*3/uL    nRBC 0.0 0.0 - 0.0 /100 WBCs    RBC 4.66 4.50 - 5.90 x10*6/uL    Hemoglobin 14.5 13.5 - 17.5 g/dL    Hematocrit 41.9 41.0 - 52.0 %    MCV 90 80 - 100 fL    MCH 31.1 26.0 - 34.0 pg    MCHC 34.6 32.0 - 36.0 g/dL    RDW 12.0 11.5 - 14.5 %    Platelets 236 150 - 450 x10*3/uL    Neutrophils % 48.1 40.0 - 80.0 %    Immature Granulocytes %, Automated 0.6 0.0 - 0.9 %    Lymphocytes % 41.6 13.0 - 44.0 %    Monocytes % 8.7 2.0 - 10.0 %    Eosinophils % 0.6 0.0 - 6.0 %    Basophils % 0.4 0.0 - 2.0 %    Neutrophils Absolute 5.23 1.20 - 7.70 x10*3/uL    Immature Granulocytes Absolute, Automated 0.06 0.00 - 0.70 x10*3/uL    Lymphocytes Absolute 4.52 1.20 - 4.80 x10*3/uL    Monocytes Absolute 0.94 0.10 - 1.00 x10*3/uL    Eosinophils Absolute 0.07 0.00 - 0.70 x10*3/uL    Basophils Absolute 0.04 0.00 - 0.10 x10*3/uL   Basic metabolic panel   Result Value Ref Range    Glucose 115 (H) 74 - 99 mg/dL    Sodium 136 136 - 145 mmol/L    Potassium 3.4 (L) 3.5 - 5.3 mmol/L    Chloride 105 98 - 107 mmol/L    Bicarbonate 24 21 - 32 mmol/L    Anion Gap 10 10 - 20 mmol/L    Urea Nitrogen 11 6 - 23 mg/dL    Creatinine 1.20 0.50 - 1.30 mg/dL    eGFR 84 >60 mL/min/1.73m*2    Calcium 8.8 8.6 - 10.3 mg/dL   Phosphorus   Result Value Ref Range    Phosphorus 4.4 2.5 - 4.9 mg/dL   Magnesium   Result Value Ref Range    Magnesium 2.39 1.60 - 2.40 mg/dL   Lactate   Result Value Ref Range    Lactate 1.0 0.4 - 2.0 mmol/L   Ethanol   Result Value Ref Range    Alcohol <10 <=10 mg/dL   Light Blue Top   Result Value Ref Range    Extra Tube Hold for add-ons.    Lavender Top   Result Value Ref Range    Extra Tube Hold for add-ons.          CT head wo IV contrast    Result Date: 11/3/2024  Interpreted By:  Ginna Rivas, STUDY: CT HEAD WO IV CONTRAST; CT CERVICAL SPINE WO IV CONTRAST;  11/3/2024 11:04 pm   INDICATION: Signs/Symptoms:Syncope head injury; Signs/Symptoms:Head injury syncope.   COMPARISON: None.   ACCESSION  NUMBER(S): [id number]; [id number]   ORDERING CLINICIAN: YON VAZQUEZ   TECHNIQUE: Noncontrast CT images of head. Axial noncontrast CT images of the cervical spine with coronal and sagittal reconstructed images.   FINDINGS: BRAIN PARENCHYMA: Gray-white matter interfaces are preserved. No mass effect or midline shift.   HEMORRHAGE: No acute intracranial hemorrhage. VENTRICLES and EXTRA-AXIAL SPACES: Normal size. EXTRACRANIAL SOFT TISSUES: Within normal limits. PARANASAL SINUSES/MASTOIDS: Mucosal thickening with near complete opacification of the right mastoid air cells and scattered opacification of the ethmoid air cells. The visualized mastoid air cells are aerated. CALVARIUM: No depressed skull fracture. No destructive osseous lesion.   OTHER FINDINGS: None.   CERVICAL SPINE:   ALIGNMENT: Normal. VERTEBRAE: No acute fracture. SPINAL CANAL: No critical spinal canal stenosis. PREVERTEBRAL SOFT TISSUES: No prevertebral soft tissue swelling. LUNG APICES: Imaged portion of the lung apices are within normal limits.   OTHER FINDINGS: Cervical mild lymphadenopathy which may be reactive..       No acute intracranial hemorrhage or mass effect.   No acute fracture or traumatic subluxation of the cervical spine.     MACRO: None.   Signed by: Ginna Rivas 11/3/2024 11:53 PM Dictation workstation:   [id number]    CT cervical spine wo IV contrast    Result Date: 11/3/2024  Interpreted By:  Ginna Rivas, STUDY: CT HEAD WO IV CONTRAST; CT CERVICAL SPINE WO IV CONTRAST;  11/3/2024 11:04 pm   INDICATION: Signs/Symptoms:Syncope head injury; Signs/Symptoms:Head injury syncope.   COMPARISON: None.   ACCESSION NUMBER(S): [id number]; [id number]   ORDERING CLINICIAN: YON VAZQUEZ   TECHNIQUE: Noncontrast CT images of head. Axial noncontrast CT images of the cervical spine with coronal and sagittal reconstructed images.   FINDINGS: BRAIN PARENCHYMA: Gray-white matter interfaces are preserved. No mass effect or midline  shift.   HEMORRHAGE: No acute intracranial hemorrhage. VENTRICLES and EXTRA-AXIAL SPACES: Normal size. EXTRACRANIAL SOFT TISSUES: Within normal limits. PARANASAL SINUSES/MASTOIDS: Mucosal thickening with near complete opacification of the right mastoid air cells and scattered opacification of the ethmoid air cells. The visualized mastoid air cells are aerated. CALVARIUM: No depressed skull fracture. No destructive osseous lesion.   OTHER FINDINGS: None.   CERVICAL SPINE:   ALIGNMENT: Normal. VERTEBRAE: No acute fracture. SPINAL CANAL: No critical spinal canal stenosis. PREVERTEBRAL SOFT TISSUES: No prevertebral soft tissue swelling. LUNG APICES: Imaged portion of the lung apices are within normal limits.   OTHER FINDINGS: Cervical mild lymphadenopathy which may be reactive..       No acute intracranial hemorrhage or mass effect.   No acute fracture or traumatic subluxation of the cervical spine.     MACRO: None.   Signed by: Ginna Rivas 11/3/2024 11:53 PM Dictation workstation:   [id number]    XR chest 1 view    Result Date: 11/3/2024  STUDY: Chest Radiograph;  11/3/2024 11:12 PM INDICATION: Aspiration. COMPARISON: None Available ACCESSION NUMBER(S): [id number] ORDERING CLINICIAN: YON VAZQUEZ TECHNIQUE:  Frontal chest was obtained at 23:12 hours. FINDINGS: CARDIOMEDIASTINAL SILHOUETTE: Cardiomediastinal silhouette is normal in size and configuration.  LUNGS: Lungs are clear.  ABDOMEN: No remarkable upper abdominal findings.  BONES: No acute osseous changes.    No acute cardiopulmonary abnormality. Signed by Jaylen Shabazz MD        Assessment/Plan   Assessment & Plan  Syncope and collapse    Bradycardia    Marijuana use    Hypokalemia    -Possibly vasovagal syncope.  Happened suddenly in the standing position.  He was smoking marijuana, tobacco and drinking alcohol and playing dart.  He felt the room was warm and needed to get out for fresh air but he syncopized.  No prior history of syncope.  -He denies  chest pain.  -Was bradycardic in the field.  -Currently hemodynamically stable.  Normal heart rate.  No chest pain.  -Will obtain echocardiogram.  -Replace potassium.  Magnesium was normal.  -Telemetry monitoring.  -Cardiology consult.      Saroj Sarah MD

## 2024-11-04 NOTE — NURSING NOTE
AVS reviewed with patient and verbalized understanding. All belongings sent with patient. Pt. Understands discharge instruction.

## 2024-11-04 NOTE — ED PROVIDER NOTES
HPI   Chief Complaint   Patient presents with    Syncope     Patient BIBA after having syncopal events at home. Patient HR was in the 30s-40s for EMS, BP was MAP of 60. EMS administered 500cc of IVF (normal saline). Patient admits to drinking three beers and smoking marijuana tonight. Patient and EMS state that patient fell forward injuring his mouth. Patient has cracked tooth. Patient denies LOC, denies blood thinners. Patient A&Ox4 on arrival to ED.         Chief complaint syncope  History of present illness 29-year-old male with a syncopal episode while playing darts today injured his facial portion of his head.  He had a bradycardia upon transfer here heart rate was in the 30s.  Patient states he has been drinking alcohol and smoking marijuana.  Just had a few drinks arrives here with a normal sinus neurologically nonfocal              Patient History   History reviewed. No pertinent past medical history.  History reviewed. No pertinent surgical history.  No family history on file.  Social History     Tobacco Use    Smoking status: Not on file    Smokeless tobacco: Not on file   Substance Use Topics    Alcohol use: Not on file    Drug use: Not on file       Physical Exam   ED Triage Vitals   Temp Pulse Resp BP   -- -- -- --      SpO2 Temp src Heart Rate Source Patient Position   -- -- -- --      BP Location FiO2 (%)     -- --       Physical Exam  Vitals and nursing note reviewed. Exam conducted with a chaperone present.   Constitutional:       Appearance: Normal appearance.   HENT:      Head: Normocephalic and atraumatic.      Nose: Nose normal.      Mouth/Throat:      Mouth: Mucous membranes are moist.   Eyes:      Pupils: Pupils are equal, round, and reactive to light.   Cardiovascular:      Rate and Rhythm: Normal rate and regular rhythm.   Pulmonary:      Effort: Pulmonary effort is normal.   Abdominal:      Palpations: Abdomen is soft.   Musculoskeletal:         General: Normal range of motion.       Cervical back: Normal range of motion.   Skin:     General: Skin is warm and dry.      Capillary Refill: Capillary refill takes less than 2 seconds.   Neurological:      General: No focal deficit present.      Mental Status: He is alert.   Psychiatric:         Mood and Affect: Mood normal.           ED Course & MDM   Diagnoses as of 11/04/24 0007   Syncope and collapse   Severe sinus bradycardia                 No data recorded     [place] Coma Scale Score: 15 (11/03/24 2254 : Jennifer Parekh RN)                           Medical Decision Making  Differential diagnosis  Bradycardia arrhythmia  Intracranial bleed  Drug use  Electrolyte disturbance  Alcohol abuse  Considering the above differential diagnosis following test treatments were ordered CT head CT of the cervical spine chest x-ray EKG electrolytes CBC lites urinalysis for drugs alcohol level troponin    Amount and/or Complexity of Data Reviewed  Labs: ordered. Decision-making details documented in ED Course.     Details: Labs reviewed  Radiology: ordered and independent interpretation performed.     Details:  chest x-ray CT of the head and neck chest x-ray was clear no aspiration CT of the head and neck were negative  ECG/medicine tests: ordered and independent interpretation performed.     Details: EKG normal sinus rhythm with a rate of 60 no acute MI pattern noted just not sure  Discussion of management or test interpretation with external provider(s): Case discussed with Dr. Sarah family counseled on admission    Risk  Decision regarding hospitalization.      Labs Reviewed   BASIC METABOLIC PANEL - Abnormal       Result Value    Glucose 115 (*)     Sodium 136      Potassium 3.4 (*)     Chloride 105      Bicarbonate 24      Anion Gap 10      Urea Nitrogen 11      Creatinine 1.20      eGFR 84      Calcium 8.8     PHOSPHORUS - Normal    Phosphorus 4.4     MAGNESIUM - Normal    Magnesium 2.39     LACTATE - Normal    Lactate 1.0      Narrative:     Venipuncture  immediately after or during the administration of Metamizole may lead to falsely low results. Testing should be performed immediately prior to Metamizole dosing.   ALCOHOL - Normal    Alcohol <10     CBC WITH AUTO DIFFERENTIAL    WBC 10.9      nRBC 0.0      RBC 4.66      Hemoglobin 14.5      Hematocrit 41.9      MCV 90      MCH 31.1      MCHC 34.6      RDW 12.0      Platelets 236      Neutrophils % 48.1      Immature Granulocytes %, Automated 0.6      Lymphocytes % 41.6      Monocytes % 8.7      Eosinophils % 0.6      Basophils % 0.4      Neutrophils Absolute 5.23      Immature Granulocytes Absolute, Automated 0.06      Lymphocytes Absolute 4.52      Monocytes Absolute 0.94      Eosinophils Absolute 0.07      Basophils Absolute 0.04     DRUG SCREEN,URINE   CBC   BASIC METABOLIC PANEL        XR chest 1 view   Final Result   No acute cardiopulmonary abnormality.   Signed by Jaylen Shabazz MD      CT head wo IV contrast   Final Result   No acute intracranial hemorrhage or mass effect.        No acute fracture or traumatic subluxation of the cervical spine.             MACRO:   None.        Signed by: Ginna Rivas 11/3/2024 11:53 PM   Dictation workstation:   [id number]      CT cervical spine wo IV contrast   Final Result   No acute intracranial hemorrhage or mass effect.        No acute fracture or traumatic subluxation of the cervical spine.             MACRO:   None.        Signed by: Ginna Rivas 11/3/2024 11:53 PM   Dictation workstation:   [id number]         Labs reviewed  Procedure  Procedures     Bulmaro Lance MD  11/04/24 0007

## 2024-11-04 NOTE — DISCHARGE INSTRUCTIONS
Follow up with cardiology for outpatient cardiac monitoring and stress test.  Follow up with your PCP.    Thank you for choosing [facility]. It has been a pleasure taking part in your medical care. Please follow up with your primary care provider as instructed. If your symptoms should persist or worsen, please contact your primary care physician, or in the case of an emergency proceed to the nearest Emergency Room for further care. If you have any questions about the care you received, please call [facility] at [phone]. Thank you again!

## 2024-11-04 NOTE — PROGRESS NOTES
11/04/24 1157   Discharge Planning   Living Arrangements Spouse/significant other   Support Systems Spouse/significant other;Children   Assistance Needed none   Type of Residence Private residence   Home or Post Acute Services None   Expected Discharge Disposition Home     Chart reviewed, writer spoke with patient- introduced self and explained role. Patient laying in bed with spouse. Writer discussed discharge needs/ concerns. He denied. Per chart became syncopal and fell. He has hx of ETOH use and Marijuana. Patient is self-pay. HRS to follow-up. SW to follow-up as needed. SOREN Diaz

## 2024-11-04 NOTE — DISCHARGE SUMMARY
Discharge Diagnosis  Syncope and collapse    Issues Requiring Follow-Up  Follow up with cardiology for outpatient monitoring and stress test  Follow up with PCP as needed    Discharge Meds     Medication List      You have not been prescribed any medications.       Test Results Pending At Discharge  Pending Labs       No current pending labs.            Hospital Course   This is a 29 year old male with no significant PMHx who presented on 11/3 for syncope and collapse. Cardiology consulted, recommend TTE and follow up outpatient for cardiac monitoring and stress test. TTE was unremarkable. He should follow up with cardiology as scheduled for further outpatient workup. Also recommend follow up with PCP. The patient will have no new medications on discharge. He will be discharged home today. Patient is hemodynamically stable at time of discharge.    Plan of care was discussed extensively with patient.  Patient verbalized understanding through teach back method.  All question and concerns addressed upon examination.     Of note, this documentation is completed using the Dragon Dictation system (voice recognition software). There may be spelling and/or grammatical errors that were not corrected prior to final submission.      Pertinent Physical Exam At Time of Discharge  Physical Exam  Constitutional:       Appearance: Normal appearance.   HENT:      Head: Normocephalic.      Mouth/Throat:      Mouth: Mucous membranes are moist.   Eyes:      Pupils: Pupils are equal, round, and reactive to light.   Cardiovascular:      Rate and Rhythm: Regular rhythm. Bradycardia present.      Heart sounds: Normal heart sounds, S1 normal and S2 normal.   Pulmonary:      Effort: Pulmonary effort is normal.      Breath sounds: Normal breath sounds.   Abdominal:      General: Bowel sounds are normal.      Palpations: Abdomen is soft.   Musculoskeletal:         General: Normal range of motion.      Cervical back: Neck supple.      Right  lower leg: No edema.      Left lower leg: No edema.   Skin:     General: Skin is warm.   Neurological:      Mental Status: He is alert and oriented to person, place, and time.   Psychiatric:         Mood and Affect: Mood normal.         Behavior: Behavior normal.         Outpatient Follow-Up  Future Appointments   Date Time Provider Department Center   11/19/2024  2:00 PM [place] [id number] STRESS ROOM 1 [id number] [facility]   11/19/2024  3:00 PM [place] [address] ECG/HOLTER [id number] [place]   12/9/2024  1:30 PM Paco Grady DO [id number] [place]         NATALIE Junior spent 35 minutes on discharge. Time calculated includes bedside evaluation, counseling, and outpatient care coordination.

## 2024-11-06 LAB
ATRIAL RATE: 61 BPM
P AXIS: 60 DEGREES
P OFFSET: 194 MS
P ONSET: 136 MS
PR INTERVAL: 168 MS
Q ONSET: 220 MS
QRS COUNT: 10 BEATS
QRS DURATION: 86 MS
QT INTERVAL: 386 MS
QTC CALCULATION(BAZETT): 388 MS
QTC FREDERICIA: 387 MS
R AXIS: 57 DEGREES
T AXIS: 75 DEGREES
T OFFSET: 413 MS
VENTRICULAR RATE: 61 BPM

## 2024-11-14 LAB
ATRIAL RATE: 60 BPM
P AXIS: 57 DEGREES
P OFFSET: 195 MS
P ONSET: 138 MS
PR INTERVAL: 164 MS
Q ONSET: 220 MS
QRS COUNT: 10 BEATS
QRS DURATION: 94 MS
QT INTERVAL: 392 MS
QTC CALCULATION(BAZETT): 392 MS
QTC FREDERICIA: 392 MS
R AXIS: 66 DEGREES
T AXIS: 69 DEGREES
T OFFSET: 416 MS
VENTRICULAR RATE: 60 BPM

## 2024-11-19 ENCOUNTER — APPOINTMENT (OUTPATIENT)
Dept: CARDIOLOGY | Facility: CLINIC | Age: 29
End: 2024-11-19

## 2024-12-09 ENCOUNTER — APPOINTMENT (OUTPATIENT)
Dept: CARDIOLOGY | Facility: CLINIC | Age: 29
End: 2024-12-09